# Patient Record
Sex: FEMALE | Employment: OTHER | URBAN - METROPOLITAN AREA
[De-identification: names, ages, dates, MRNs, and addresses within clinical notes are randomized per-mention and may not be internally consistent; named-entity substitution may affect disease eponyms.]

---

## 2023-09-27 ENCOUNTER — HOSPITAL ENCOUNTER (OUTPATIENT)
Dept: CT IMAGING | Age: 63
Discharge: HOME | End: 2023-09-27
Payer: MEDICARE

## 2023-09-27 VITALS
BODY MASS INDEX: 25.96 KG/M2 | RESPIRATION RATE: 16 BRPM | WEIGHT: 141.09 LBS | HEIGHT: 62 IN | OXYGEN SATURATION: 95 % | HEART RATE: 70 BPM | TEMPERATURE: 97.1 F | SYSTOLIC BLOOD PRESSURE: 169 MMHG | DIASTOLIC BLOOD PRESSURE: 78 MMHG

## 2023-09-27 DIAGNOSIS — N17.9 ACUTE KIDNEY INJURY (HCC): ICD-10-CM

## 2023-09-27 LAB
INR PPP: 0.99 (ref 0.84–1.16)
PLATELET # BLD AUTO: 246 K/UL (ref 135–450)
PROTHROMBIN TIME: 13.1 SEC (ref 11.5–14.8)

## 2023-09-27 PROCEDURE — 7100000011 HC PHASE II RECOVERY - ADDTL 15 MIN: Performed by: RADIOLOGY

## 2023-09-27 PROCEDURE — 99152 MOD SED SAME PHYS/QHP 5/>YRS: CPT

## 2023-09-27 PROCEDURE — 36415 COLL VENOUS BLD VENIPUNCTURE: CPT

## 2023-09-27 PROCEDURE — 85610 PROTHROMBIN TIME: CPT

## 2023-09-27 PROCEDURE — 6360000002 HC RX W HCPCS: Performed by: RADIOLOGY

## 2023-09-27 PROCEDURE — 85049 AUTOMATED PLATELET COUNT: CPT

## 2023-09-27 PROCEDURE — 77012 CT SCAN FOR NEEDLE BIOPSY: CPT

## 2023-09-27 PROCEDURE — 7100000010 HC PHASE II RECOVERY - FIRST 15 MIN: Performed by: RADIOLOGY

## 2023-09-27 RX ORDER — LOSARTAN POTASSIUM 50 MG/1
50 TABLET ORAL DAILY
COMMUNITY

## 2023-09-27 RX ORDER — NIFEDIPINE 30 MG/1
60 TABLET, FILM COATED, EXTENDED RELEASE ORAL 2 TIMES DAILY
COMMUNITY

## 2023-09-27 RX ORDER — FENTANYL CITRATE 50 UG/ML
INJECTION, SOLUTION INTRAMUSCULAR; INTRAVENOUS PRN
Status: COMPLETED | OUTPATIENT
Start: 2023-09-27 | End: 2023-09-27

## 2023-09-27 RX ORDER — MIDAZOLAM HYDROCHLORIDE 1 MG/ML
INJECTION INTRAMUSCULAR; INTRAVENOUS PRN
Status: COMPLETED | OUTPATIENT
Start: 2023-09-27 | End: 2023-09-27

## 2023-09-27 RX ORDER — METOPROLOL SUCCINATE 50 MG/1
50 TABLET, EXTENDED RELEASE ORAL DAILY
COMMUNITY

## 2023-09-27 RX ADMIN — FENTANYL CITRATE 25 MCG: 50 INJECTION INTRAMUSCULAR; INTRAVENOUS at 09:04

## 2023-09-27 RX ADMIN — MIDAZOLAM 1 MG: 1 INJECTION INTRAMUSCULAR; INTRAVENOUS at 09:02

## 2023-09-27 RX ADMIN — MIDAZOLAM 1 MG: 1 INJECTION INTRAMUSCULAR; INTRAVENOUS at 08:58

## 2023-09-27 RX ADMIN — FENTANYL CITRATE 50 MCG: 50 INJECTION INTRAMUSCULAR; INTRAVENOUS at 08:58

## 2023-09-27 ASSESSMENT — PAIN - FUNCTIONAL ASSESSMENT: PAIN_FUNCTIONAL_ASSESSMENT: NONE - DENIES PAIN

## 2023-09-27 ASSESSMENT — PAIN SCALES - GENERAL: PAINLEVEL_OUTOF10: 0

## 2023-09-27 NOTE — PRE SEDATION
Sedation Pre-Procedure Note    Patient Name: Dayanna Sigala   YOB: 1960  Room/Bed: Room/bed info not found  Medical Record Number: [id number]  Date: 9/27/2023   Time: 9:13 AM       Indication:  Random renal biopsy. Consent: I have discussed with the patient and/or the patient representative the indication, alternatives, and the possible risks and/or complications of the planned procedure and the anesthesia methods. The patient and/or patient representative appear to understand and agree to proceed. Vital Signs:   Vitals:    09/27/23 0909   BP: 123/71   Pulse: 59   Resp: 18   Temp:    SpO2: 98%       Past Medical History:   has no past medical history on file. Past Surgical History:   has no past surgical history on file. Medications:   Scheduled Meds:   Continuous Infusions:   PRN Meds:   Home Meds:   Prior to Admission medications    Medication Sig Start Date End Date Taking? Authorizing Provider   NIFEdipine (ADALAT CC) 30 MG extended release tablet Take 2 tablets by mouth in the morning and at bedtime   Yes Historical Provider, MD   metoprolol succinate (TOPROL XL) 50 MG extended release tablet Take 1 tablet by mouth daily   Yes Historical Provider, MD   losartan (COZAAR) 50 MG tablet Take 1 tablet by mouth daily   Yes Historical Provider, MD     Coumadin Use Last 7 Days:  no  Antiplatelet drug therapy use last 7 days: no  Other anticoagulant use last 7 days: no  Additional Medication Information:  n/a      Pre-Sedation Documentation and Exam:   I have reviewed the patient's history and review of systems.     Mallampati Airway Assessment:  Mallampati Class II - (soft palate, fauces & uvula are visible)    Prior History of Anesthesia Complications:   none    ASA Classification:  Class 2 - A normal healthy patient with mild systemic disease    Sedation/ Anesthesia Plan:   intravenous sedation    Medications Planned:   midazolam (Versed) intravenously and fentanyl intravenously    Patient

## 2023-09-27 NOTE — BRIEF OP NOTE
Brief Postoperative Note    Sherin Fragoso  YOB: 1960  [id number]    Pre-operative Diagnosis: SHADE    Post-operative Diagnosis: Same    Procedure: CT-guided random left renal biopsy. Anesthesia: Moderate Sedation    Surgeons: Carolyn Osorio MD    Estimated Blood Loss: Less than 5 mL    Complications: small amount of perinephric hematoma    Specimens: Was Obtained: 2 18g cores    Findings: Successful CT-guided random left renal biopsy.     Electronically signed by Carolyn Osorio MD on 9/27/2023 at 9:14 AM

## 2023-09-27 NOTE — PROGRESS NOTES
Dc instructions went over with family and all questions answered. Iv removed with no issues. Pt able to urinate.  Will monitor,

## 2023-09-27 NOTE — DISCHARGE INSTRUCTIONS
[address]  [address] [address] [place] [address], [address] [place] [address] Drive  Telephone: [phone]      PATIENT NAME: Damián Hussein  MEDICAL RECORD NUMBER:  [id number]  TODAY'S DATE: @ED@    Discharge Instructions - Post Kidney Biopsy    [x]You may place a cold pack  on top of the dressing for at least 3 hours removing it every 20 minutes for 5 minutes after your biopsy, if you are having pain. [x] Do not take Aspirin or Aspirin products the day of your procedure and for the following two days. [x] Your physician has instructed you to take Tylenol (Acetaminophen) the day of your         biopsy for any discomfort. [x] Watch for excessive bleeding, increased pain, fever, redness or drainage at your biopsy site. If this occurs call your physician. Passing some blood in  Your urine is to be expected. If this would increase in amount, rather than decrease,  over the next two days, contact your doctor. [x] Do not participate in any strenuous exercise for 48 hours after your biopsy, such as         tennis, aerobics, weight lifting and household activities. Do not lift over 10 pounds for two days. [x] You may remove your dressing tomorrow and shower. Do not submerge your biopsy site in water for 4 days (ie tub, pool, hot tub, etc)    Your physician will receive a report within 2-3 business days. Please contact the office for results.

## 2023-09-27 NOTE — PROGRESS NOTES
Patient arrived to phase two at 0915. Patient is oriented X4, remains drowsy. Dressing to left flank is clean, dry, and intact. Sister at bedside. Will continue to monitor.